# Patient Record
Sex: FEMALE | Race: WHITE | Employment: OTHER | URBAN - METROPOLITAN AREA
[De-identification: names, ages, dates, MRNs, and addresses within clinical notes are randomized per-mention and may not be internally consistent; named-entity substitution may affect disease eponyms.]

---

## 2017-01-20 RX ORDER — PRAVASTATIN SODIUM 40 MG
40 TABLET ORAL EVERY EVENING
Qty: 90 TABLET | Refills: 1 | Status: SHIPPED | OUTPATIENT
Start: 2017-01-20 | End: 2017-08-07 | Stop reason: SINTOL

## 2017-07-11 ENCOUNTER — TELEPHONE (OUTPATIENT)
Dept: FAMILY MEDICINE CLINIC | Age: 72
End: 2017-07-11

## 2017-08-07 ENCOUNTER — TELEPHONE (OUTPATIENT)
Dept: FAMILY MEDICINE CLINIC | Age: 72
End: 2017-08-07

## 2017-08-15 LAB
ALBUMIN SERPL-MCNC: 3.9 G/DL (ref 3.2–5.3)
ALK PHOSPHATASE: 59 IU/L (ref 35–121)
ALT SERPL-CCNC: 27 IU/L (ref 5–59)
ANION GAP SERPL CALCULATED.3IONS-SCNC: 11 MMOL/L
AST SERPL-CCNC: 17 IU/L (ref 10–42)
BILIRUB SERPL-MCNC: 0.5 MG/DL (ref 0.2–1.3)
BUN BLDV-MCNC: 18 MG/DL (ref 9–24)
CALCIUM SERPL-MCNC: 9.4 MG/DL (ref 8.7–10.8)
CHLORIDE BLD-SCNC: 106 MMOL/L (ref 95–111)
CHOLESTEROL, TOTAL: NORMAL MG/DL
CHOLESTEROL/HDL RATIO: 3.2
CHOLESTEROL/HDL RATIO: NORMAL
CHOLESTEROL: 225 MG/DL
CO2: 30 MMOL/L (ref 21–32)
CREAT SERPL-MCNC: 0.7 MG/DL (ref 0.5–1.3)
EGFR AFRICAN AMERICAN: 99
EGFR IF NONAFRICAN AMERICAN: 82
GLUCOSE: 88 MG/DL (ref 70–100)
HDLC SERPL-MCNC: 71 MG/DL (ref 40–60)
HDLC SERPL-MCNC: NORMAL MG/DL (ref 35–70)
LDL CHOLESTEROL CALCULATED: 1.9 MG/DL (ref 0–160)
LDL CHOLESTEROL CALCULATED: 136 MG/DL
LDL/HDL RATIO: 1.9
POTASSIUM SERPL-SCNC: 4.5 MMOL/L (ref 3.5–5.4)
SODIUM BLD-SCNC: 142 MMOL/L (ref 134–147)
TOTAL PROTEIN: 6.2 G/DL (ref 5.8–8)
TRIGL SERPL-MCNC: 88 MG/DL
TRIGL SERPL-MCNC: NORMAL MG/DL
VLDLC SERPL CALC-MCNC: 18 MG/DL
VLDLC SERPL CALC-MCNC: NORMAL MG/DL

## 2017-08-16 LAB — VITAMIN D 25-HYDROXY: 30 NG/ML

## 2017-08-17 ENCOUNTER — TELEPHONE (OUTPATIENT)
Dept: FAMILY MEDICINE CLINIC | Age: 72
End: 2017-08-17

## 2017-08-17 DIAGNOSIS — E55.9 VITAMIN D DEFICIENCY: Primary | ICD-10-CM

## 2017-08-17 DIAGNOSIS — E55.9 VITAMIN D DEFICIENCY: ICD-10-CM

## 2017-08-17 DIAGNOSIS — E78.00 PURE HYPERCHOLESTEROLEMIA: ICD-10-CM

## 2017-10-02 ENCOUNTER — OFFICE VISIT (OUTPATIENT)
Dept: FAMILY MEDICINE CLINIC | Age: 72
End: 2017-10-02

## 2017-10-02 VITALS
BODY MASS INDEX: 22.68 KG/M2 | HEIGHT: 63 IN | RESPIRATION RATE: 16 BRPM | DIASTOLIC BLOOD PRESSURE: 58 MMHG | SYSTOLIC BLOOD PRESSURE: 106 MMHG | WEIGHT: 128 LBS | HEART RATE: 68 BPM

## 2017-10-02 DIAGNOSIS — M54.50 CHRONIC BILATERAL LOW BACK PAIN WITHOUT SCIATICA: ICD-10-CM

## 2017-10-02 DIAGNOSIS — E78.00 PURE HYPERCHOLESTEROLEMIA: Primary | ICD-10-CM

## 2017-10-02 DIAGNOSIS — Z23 NEED FOR INFLUENZA VACCINATION: ICD-10-CM

## 2017-10-02 DIAGNOSIS — G89.29 CHRONIC BILATERAL LOW BACK PAIN WITHOUT SCIATICA: ICD-10-CM

## 2017-10-02 PROCEDURE — G0008 ADMIN INFLUENZA VIRUS VAC: HCPCS | Performed by: FAMILY MEDICINE

## 2017-10-02 PROCEDURE — 99214 OFFICE O/P EST MOD 30 MIN: CPT | Performed by: FAMILY MEDICINE

## 2017-10-02 RX ORDER — PRAVASTATIN SODIUM 40 MG
40 TABLET ORAL DAILY
COMMUNITY
End: 2017-11-09 | Stop reason: SDUPTHER

## 2017-10-02 ASSESSMENT — PATIENT HEALTH QUESTIONNAIRE - PHQ9
1. LITTLE INTEREST OR PLEASURE IN DOING THINGS: 0
SUM OF ALL RESPONSES TO PHQ QUESTIONS 1-9: 0
SUM OF ALL RESPONSES TO PHQ9 QUESTIONS 1 & 2: 0
2. FEELING DOWN, DEPRESSED OR HOPELESS: 0

## 2017-10-02 ASSESSMENT — ENCOUNTER SYMPTOMS
CONSTIPATION: 0
BACK PAIN: 1
SHORTNESS OF BREATH: 0
SINUS PRESSURE: 0

## 2017-10-02 NOTE — PATIENT INSTRUCTIONS
Take some CoQ10 for a week then resume the pravastatin.   If you are able to stay on the pravastatin then check the fasting LDL in early April  Check the vit D in early April too  Consider taking 2 Aleve with food before leaving the house for work  Be sure to contact Dr Beverly Smith office for colonoscopy  Get the mammogram arranged

## 2017-10-02 NOTE — PROGRESS NOTES
Subjective:      Patient ID: Hazel Pompa is a 74 y.o. female. HPI  1. She had noticed some muscle ache when the pharmacy changed manufactures of the pravastatin. 2. She stopped it and felt better having just some occasional cramps but is working on her feet now too. 3. She wears a patch on the lower back at times the past 6 years. It doesn't radiate or worsen. Review of Systems   Constitutional: Negative for fatigue. HENT: Negative for sinus pressure. Eyes: Negative for visual disturbance. Respiratory: Negative for shortness of breath. Cardiovascular: Negative for chest pain. Gastrointestinal: Negative for constipation. Genitourinary: Negative. Musculoskeletal: Positive for back pain. Negative for arthralgias. Skin: Negative for rash. Neurological: Negative for headaches. The patient's medications, allergies, past medical problems, surgical, social, and family histories were reviewed and updated as needed. Objective:   Physical Exam   Constitutional: She is oriented to person, place, and time. She appears well-developed and well-nourished. No distress. HENT:   Head: Normocephalic and atraumatic. Right Ear: External ear normal.   Left Ear: External ear normal.   Nose: Nose normal.   Mouth/Throat: Oropharynx is clear and moist. No oropharyngeal exudate. Eyes: Conjunctivae are normal. No scleral icterus. Neck: Neck supple. Carotid bruit is not present. No tracheal deviation present. No thyromegaly present. Cardiovascular: Normal rate, regular rhythm, normal heart sounds and intact distal pulses. Pulmonary/Chest: Effort normal and breath sounds normal.   Abdominal: Soft. Bowel sounds are normal. She exhibits no mass. Musculoskeletal: She exhibits no edema. Legs:  Lymphadenopathy:     She has no cervical adenopathy. Neurological: She is alert and oriented to person, place, and time. Skin: Skin is warm and dry. Psychiatric: She has a normal mood and affect. Her behavior is normal.   Blood pressure (!) 106/58, pulse 68, resp. rate 16, height 5' 3\" (1.6 m), weight 128 lb (58.1 kg), not currently breastfeeding. Assessment:      1. Pure hypercholesterolemia  LDL Cholesterol, Direct   2. Chronic bilateral low back pain without sciatica     3. Need for influenza vaccination  INFLUENZA, QUADV, 18 YRS AND OLDER, IM, MDV, 0.5ML (AFLURIA QUADV)           Plan:      Take some CoQ10 for a week then resume the pravastatin.   If you are able to stay on the pravastatin then check the fasting LDL in early April  Check the vit D in early April too  Consider taking 2 Aleve with food before leaving the house for work  Be sure to contact Dr Beverly Smith office for colonoscopy  Get the mammogram arranged

## 2017-11-10 RX ORDER — PRAVASTATIN SODIUM 40 MG
40 TABLET ORAL DAILY
Qty: 90 TABLET | Refills: 3 | Status: SHIPPED | OUTPATIENT
Start: 2017-11-10 | End: 2019-01-18 | Stop reason: SDUPTHER

## 2018-09-25 ENCOUNTER — TELEPHONE (OUTPATIENT)
Dept: FAMILY MEDICINE CLINIC | Age: 73
End: 2018-09-25

## 2018-09-25 DIAGNOSIS — E78.00 PURE HYPERCHOLESTEROLEMIA: Primary | ICD-10-CM

## 2018-09-25 DIAGNOSIS — E55.9 VITAMIN D DEFICIENCY: ICD-10-CM

## 2019-01-08 LAB
ALBUMIN SERPL-MCNC: 4.3 G/DL (ref 3.5–5.2)
ALK PHOSPHATASE: 71 U/L (ref 30–146)
ALT SERPL-CCNC: 35 U/L (ref 5–40)
ANION GAP SERPL CALCULATED.3IONS-SCNC: 17 MEQ/L (ref 10–19)
AST SERPL-CCNC: 26 U/L (ref 9–40)
BILIRUB SERPL-MCNC: 0.3 MG/DL
BUN BLDV-MCNC: 26 MG/DL (ref 8–23)
CALCIUM SERPL-MCNC: 9.7 MG/DL (ref 8.5–10.5)
CHLORIDE BLD-SCNC: 97 MEQ/L (ref 95–107)
CHOLESTEROL/HDL RATIO: 2.9
CHOLESTEROL: 254 MG/DL
CO2: 27 MEQ/L (ref 19–31)
CREAT SERPL-MCNC: 0.8 MG/DL (ref 0.6–1.3)
EGFR AFRICAN AMERICAN: 83.6 ML/MIN/1.73 M2
EGFR IF NONAFRICAN AMERICAN: 72.1 ML/MIN/1.73 M2
GLUCOSE: 103 MG/DL (ref 70–99)
HDLC SERPL-MCNC: 88.5 MG/DL
LDL CHOLESTEROL CALCULATED: 150 MG/DL
LDL/HDL RATIO: 1.7
POTASSIUM SERPL-SCNC: 4.8 MEQ/L (ref 3.5–5.4)
SODIUM BLD-SCNC: 141 MEQ/L (ref 135–146)
TOTAL PROTEIN: 7.1 G/DL (ref 6.1–8.3)
TRIGL SERPL-MCNC: 76 MG/DL
VLDLC SERPL CALC-MCNC: 15 MG/DL

## 2019-01-10 LAB — VITAMIN D 25-HYDROXY: 27 NG/ML

## 2019-01-11 DIAGNOSIS — E55.9 VITAMIN D DEFICIENCY: ICD-10-CM

## 2019-01-11 DIAGNOSIS — E78.00 PURE HYPERCHOLESTEROLEMIA: Primary | ICD-10-CM

## 2019-01-14 ENCOUNTER — TELEPHONE (OUTPATIENT)
Dept: FAMILY MEDICINE CLINIC | Age: 74
End: 2019-01-14

## 2019-01-18 RX ORDER — PRAVASTATIN SODIUM 40 MG
40 TABLET ORAL DAILY
Qty: 90 TABLET | Refills: 3 | Status: SHIPPED | OUTPATIENT
Start: 2019-01-18 | End: 2020-07-21 | Stop reason: SDUPTHER

## 2019-03-26 LAB
ALBUMIN SERPL-MCNC: 4.3 G/DL (ref 3.5–5.2)
ALK PHOSPHATASE: 64 U/L (ref 30–146)
ALT SERPL-CCNC: 25 U/L (ref 5–40)
AST SERPL-CCNC: 22 U/L (ref 9–40)
BILIRUB SERPL-MCNC: 0.3 MG/DL
BILIRUBIN DIRECT: <0.2 MG/DL
GLUCOSE: 89 MG/DL (ref 70–99)
LDL CHOLESTEROL DIRECT: 122 MG/DL
TOTAL PROTEIN: 6.7 G/DL (ref 6.1–8.3)

## 2019-03-27 LAB — VITAMIN D 25-HYDROXY: 35 NG/ML

## 2019-04-05 DIAGNOSIS — E55.9 VITAMIN D DEFICIENCY: ICD-10-CM

## 2019-04-05 DIAGNOSIS — E78.00 PURE HYPERCHOLESTEROLEMIA: Primary | ICD-10-CM

## 2019-04-10 ENCOUNTER — TELEPHONE (OUTPATIENT)
Dept: FAMILY MEDICINE CLINIC | Age: 74
End: 2019-04-10

## 2019-04-10 NOTE — TELEPHONE ENCOUNTER
----- Message from Masood Chandler MD sent at 4/5/2019  2:35 PM EDT -----  Let her know the labs showed the need to continue with over the counter vit D3 at 5,000 units once daily with a meal for better absorption. Check the fasting lab in early October. The LDL is better but could still be better still. Continue the diet and daily walking for 20 minutes. Check it fasting in early October also.

## 2019-06-10 ENCOUNTER — OFFICE VISIT (OUTPATIENT)
Dept: FAMILY MEDICINE CLINIC | Age: 74
End: 2019-06-10

## 2019-06-10 VITALS
SYSTOLIC BLOOD PRESSURE: 120 MMHG | RESPIRATION RATE: 12 BRPM | HEART RATE: 64 BPM | DIASTOLIC BLOOD PRESSURE: 70 MMHG | HEIGHT: 63 IN | WEIGHT: 125 LBS | BODY MASS INDEX: 22.15 KG/M2

## 2019-06-10 DIAGNOSIS — M54.50 LUMBAR BACK PAIN: ICD-10-CM

## 2019-06-10 DIAGNOSIS — M53.3 SACROILIAC JOINT DYSFUNCTION OF LEFT SIDE: Primary | ICD-10-CM

## 2019-06-10 PROCEDURE — 4040F PNEUMOC VAC/ADMIN/RCVD: CPT | Performed by: FAMILY MEDICINE

## 2019-06-10 PROCEDURE — 3017F COLORECTAL CA SCREEN DOC REV: CPT | Performed by: FAMILY MEDICINE

## 2019-06-10 PROCEDURE — 1123F ACP DISCUSS/DSCN MKR DOCD: CPT | Performed by: FAMILY MEDICINE

## 2019-06-10 PROCEDURE — G8400 PT W/DXA NO RESULTS DOC: HCPCS | Performed by: FAMILY MEDICINE

## 2019-06-10 PROCEDURE — G8420 CALC BMI NORM PARAMETERS: HCPCS | Performed by: FAMILY MEDICINE

## 2019-06-10 PROCEDURE — 1090F PRES/ABSN URINE INCON ASSESS: CPT | Performed by: FAMILY MEDICINE

## 2019-06-10 PROCEDURE — 99213 OFFICE O/P EST LOW 20 MIN: CPT | Performed by: FAMILY MEDICINE

## 2019-06-10 PROCEDURE — G8427 DOCREV CUR MEDS BY ELIG CLIN: HCPCS | Performed by: FAMILY MEDICINE

## 2019-06-10 RX ORDER — PREDNISONE 20 MG/1
TABLET ORAL
Qty: 21 TABLET | Refills: 0 | Status: SHIPPED | OUTPATIENT
Start: 2019-06-10 | End: 2019-06-24 | Stop reason: ALTCHOICE

## 2019-06-10 RX ORDER — TIZANIDINE 4 MG/1
4 TABLET ORAL EVERY 8 HOURS PRN
Qty: 30 TABLET | Refills: 1 | Status: SHIPPED | OUTPATIENT
Start: 2019-06-10 | End: 2019-06-24 | Stop reason: ALTCHOICE

## 2019-06-10 RX ORDER — TRAMADOL HYDROCHLORIDE 50 MG/1
50 TABLET ORAL EVERY 6 HOURS PRN
Qty: 28 TABLET | Refills: 0 | Status: SHIPPED | OUTPATIENT
Start: 2019-06-10 | End: 2019-06-17

## 2019-06-10 ASSESSMENT — ENCOUNTER SYMPTOMS
SORE THROAT: 0
ABDOMINAL PAIN: 0
BACK PAIN: 1
CONSTIPATION: 0
NAUSEA: 0
EYE PAIN: 0
CHEST TIGHTNESS: 0
SHORTNESS OF BREATH: 0
COUGH: 0
WHEEZING: 0

## 2019-06-10 NOTE — PROGRESS NOTES
Subjective:      Patient ID: Joseph Laguerre is a 74 y.o. female. Hip Pain    The incident occurred 5 to 7 days ago. The incident occurred at home. There was no injury mechanism. The pain is present in the left hip and left knee (  low  back area ). The quality of the pain is described as aching. The pain has been fluctuating since onset. Pertinent negatives include no numbness. The symptoms are aggravated by movement. She has tried ice and NSAIDs for the symptoms. The treatment provided mild relief. Past Medical History:   Diagnosis Date    Hyperlipidemia     Low back pain 6/26/2015    Vitamin D deficiency      Review of Systems   Constitutional: Negative for appetite change, fatigue and fever. HENT: Negative for congestion, ear pain, postnasal drip and sore throat. Eyes: Negative for pain and visual disturbance. Respiratory: Negative for cough, chest tightness, shortness of breath and wheezing. Cardiovascular: Negative for chest pain, palpitations and leg swelling. Gastrointestinal: Negative for abdominal pain, constipation and nausea. Genitourinary: Negative for dysuria and frequency. Musculoskeletal: Positive for back pain. Negative for arthralgias, joint swelling, neck pain and neck stiffness. Skin: Negative for rash. Neurological: Negative for dizziness, weakness, numbness and headaches. Hematological: Negative for adenopathy. Does not bruise/bleed easily. Psychiatric/Behavioral: Negative for behavioral problems and sleep disturbance. The patient is not nervous/anxious. /70 (Site: Right Upper Arm, Position: Sitting, Cuff Size: Medium Adult)   Pulse 64   Resp 12   Ht 5' 3\" (1.6 m)   Wt 125 lb (56.7 kg)   BMI 22.14 kg/m²   Objective:   Physical Exam   Constitutional: She is oriented to person, place, and time. She appears well-developed and well-nourished. HENT:   Head: Normocephalic and atraumatic.    Right Ear: External ear normal.   Left Ear: External ear normal.   Nose: Nose normal.   Mouth/Throat: Oropharynx is clear and moist.   Eyes: Pupils are equal, round, and reactive to light. Conjunctivae and EOM are normal. No scleral icterus. Neck: Normal range of motion. Neck supple. No JVD present. No thyromegaly present. Cardiovascular: Normal rate, regular rhythm, normal heart sounds and intact distal pulses. Pulmonary/Chest: Effort normal and breath sounds normal. She has no wheezes. She has no rales. Abdominal: Soft. Bowel sounds are normal. She exhibits no distension and no mass. There is no tenderness. Musculoskeletal:        Lumbar back: She exhibits decreased range of motion, tenderness, pain and spasm. Back:    Lymphadenopathy:     She has no cervical adenopathy. Neurological: She is alert and oriented to person, place, and time. She has normal reflexes. No cranial nerve deficit. Skin: Skin is warm and dry. No rash noted. Psychiatric: She has a normal mood and affect. Nursing note and vitals reviewed. Assessment:       Diagnosis Orders   1. Sacroiliac joint dysfunction of left side     2. Lumbar back pain           Plan:      Current Outpatient Medications   Medication Sig Dispense Refill    predniSONE (DELTASONE) 20 MG tablet One   Tab po  Tid  For 3 days and  Them one  Bid  For  5  Days and then one a  Day  For  5  Days 21 tablet 0    tiZANidine (ZANAFLEX) 4 MG tablet Take 1 tablet by mouth every 8 hours as needed (back spasm) 30 tablet 1    traMADol (ULTRAM) 50 MG tablet Take 1 tablet by mouth every 6 hours as needed for Pain for up to 7 days. Intended supply: 7 days. Take lowest dose possible to manage pain 28 tablet 0    pravastatin (PRAVACHOL) 40 MG tablet Take 1 tablet by mouth daily 90 tablet 3    B Complex Vitamins (B-COMPLEX/B-12 PO) Take by mouth      Cholecalciferol (VITAMIN D3) 5000 UNITS CAPS Take 1 capsule by mouth daily.  Flaxseed, Linseed, (FLAX SEED OIL PO) Take  by mouth.       Omega-3 Fatty Acids (FISH OIL PO) Take  by mouth.  Coenzyme Q10 (CO Q 10 PO) Take  by mouth. No current facility-administered medications for this visit. No results found for this visit on 06/10/19. No orders of the defined types were placed in this encounter.      see rikki Hernandez MD

## 2019-06-17 ENCOUNTER — TELEPHONE (OUTPATIENT)
Dept: FAMILY MEDICINE CLINIC | Age: 74
End: 2019-06-17

## 2019-06-17 DIAGNOSIS — M53.3 SACROILIAC JOINT DYSFUNCTION OF LEFT SIDE: Primary | ICD-10-CM

## 2019-06-17 DIAGNOSIS — M54.50 LUMBAR BACK PAIN: ICD-10-CM

## 2019-06-17 NOTE — TELEPHONE ENCOUNTER
Pt called still having left side hip pain. Pt had a massage since appt here but no better. Pt ask if you feel an xray may be needed or other testing?     [address]    Please call pt to inform

## 2019-06-18 ENCOUNTER — TELEPHONE (OUTPATIENT)
Dept: FAMILY MEDICINE CLINIC | Age: 74
End: 2019-06-18

## 2019-06-18 NOTE — TELEPHONE ENCOUNTER
The x rays showed an increase in the arthritis from 2015 in the hips and lower back. It would be good if she could see me to evaluate her symptoms myself.

## 2019-06-20 ENCOUNTER — OFFICE VISIT (OUTPATIENT)
Dept: FAMILY MEDICINE CLINIC | Age: 74
End: 2019-06-20

## 2019-06-20 VITALS
WEIGHT: 127.38 LBS | RESPIRATION RATE: 13 BRPM | SYSTOLIC BLOOD PRESSURE: 138 MMHG | HEART RATE: 74 BPM | DIASTOLIC BLOOD PRESSURE: 84 MMHG | HEIGHT: 63 IN | BODY MASS INDEX: 22.57 KG/M2

## 2019-06-20 DIAGNOSIS — I73.00 RAYNAUD'S PHENOMENON WITHOUT GANGRENE: Primary | ICD-10-CM

## 2019-06-20 PROCEDURE — 4040F PNEUMOC VAC/ADMIN/RCVD: CPT | Performed by: FAMILY MEDICINE

## 2019-06-20 PROCEDURE — 99213 OFFICE O/P EST LOW 20 MIN: CPT | Performed by: FAMILY MEDICINE

## 2019-06-20 PROCEDURE — G8427 DOCREV CUR MEDS BY ELIG CLIN: HCPCS | Performed by: FAMILY MEDICINE

## 2019-06-20 PROCEDURE — 3017F COLORECTAL CA SCREEN DOC REV: CPT | Performed by: FAMILY MEDICINE

## 2019-06-20 PROCEDURE — G8400 PT W/DXA NO RESULTS DOC: HCPCS | Performed by: FAMILY MEDICINE

## 2019-06-20 PROCEDURE — 1123F ACP DISCUSS/DSCN MKR DOCD: CPT | Performed by: FAMILY MEDICINE

## 2019-06-20 PROCEDURE — 1090F PRES/ABSN URINE INCON ASSESS: CPT | Performed by: FAMILY MEDICINE

## 2019-06-20 PROCEDURE — G8420 CALC BMI NORM PARAMETERS: HCPCS | Performed by: FAMILY MEDICINE

## 2019-06-20 ASSESSMENT — ENCOUNTER SYMPTOMS
SHORTNESS OF BREATH: 0
SORE THROAT: 0
CHEST TIGHTNESS: 0
NAUSEA: 0
COUGH: 0
WHEEZING: 0
ABDOMINAL PAIN: 0
CONSTIPATION: 0
EYE PAIN: 0

## 2019-06-20 ASSESSMENT — PATIENT HEALTH QUESTIONNAIRE - PHQ9
SUM OF ALL RESPONSES TO PHQ QUESTIONS 1-9: 0
2. FEELING DOWN, DEPRESSED OR HOPELESS: 0
SUM OF ALL RESPONSES TO PHQ QUESTIONS 1-9: 0
SUM OF ALL RESPONSES TO PHQ9 QUESTIONS 1 & 2: 0
1. LITTLE INTEREST OR PLEASURE IN DOING THINGS: 0

## 2019-06-20 NOTE — PROGRESS NOTES
Subjective:      Patient ID: Alfonso Alvarez is a 74 y.o. female. Back area    With  Pain  And  With  Arthritis  And  Scoliosis      Toes    Red and  noted     Other   This is a new problem. The current episode started in the past 7 days. Associated symptoms include myalgias. Pertinent negatives include no abdominal pain, arthralgias, chest pain, congestion, coughing, fatigue, fever, headaches, joint swelling, nausea, neck pain, numbness, rash, sore throat or weakness. Associated symptoms comments: Toes  Darker   Color      Some    coldness. Nothing aggravates the symptoms. Past Medical History:   Diagnosis Date    Hyperlipidemia     Low back pain 6/26/2015    Vitamin D deficiency      Review of Systems   Constitutional: Negative for appetite change, fatigue and fever. HENT: Negative for congestion, ear pain, postnasal drip and sore throat. Eyes: Negative for pain and visual disturbance. Respiratory: Negative for cough, chest tightness, shortness of breath and wheezing. Cardiovascular: Negative for chest pain, palpitations and leg swelling. Gastrointestinal: Negative for abdominal pain, constipation and nausea. Genitourinary: Negative for dysuria and frequency. Musculoskeletal: Positive for myalgias. Negative for arthralgias, joint swelling, neck pain and neck stiffness. Toes  blueness  All  Noted and   Blue  To  Th mid foot     Skin: Negative for rash. Neurological: Negative for dizziness, weakness, numbness and headaches. Hematological: Negative for adenopathy. Does not bruise/bleed easily. Psychiatric/Behavioral: Negative for behavioral problems and sleep disturbance. The patient is not nervous/anxious. /84 (Site: Right Upper Arm, Position: Sitting, Cuff Size: Medium Adult)   Pulse 74   Resp 13   Ht 5' 3\" (1.6 m)   Wt 127 lb 6 oz (57.8 kg)   BMI 22.56 kg/m²   Objective:   Physical Exam   Constitutional: She is oriented to person, place, and time.  She appears well-developed and well-nourished. HENT:   Head: Normocephalic and atraumatic. Right Ear: External ear normal.   Left Ear: External ear normal.   Nose: Nose normal.   Mouth/Throat: Oropharynx is clear and moist.   Eyes: Pupils are equal, round, and reactive to light. Conjunctivae and EOM are normal. No scleral icterus. Neck: Normal range of motion. Neck supple. No JVD present. No thyromegaly present. Cardiovascular: Normal rate, regular rhythm, normal heart sounds and intact distal pulses. Pulmonary/Chest: Effort normal and breath sounds normal. She has no wheezes. She has no rales. Abdominal: Soft. Bowel sounds are normal. She exhibits no distension and no mass. There is no tenderness. Musculoskeletal: Normal range of motion. She exhibits no tenderness. Left  Low  Back pain   Lymphadenopathy:     She has no cervical adenopathy. Neurological: She is alert and oriented to person, place, and time. She has normal reflexes. No cranial nerve deficit. Skin: Skin is warm and dry. No rash noted. Toes   Blue and  With elevation of  Legs  Clears      pulse  2 +   Psychiatric: She has a normal mood and affect. Nursing note and vitals reviewed. Assessment:       Diagnosis Orders   1. Raynaud's phenomenon without gangrene           Plan:        Current Outpatient Medications   Medication Sig Dispense Refill    predniSONE (DELTASONE) 20 MG tablet One   Tab po  Tid  For 3 days and  Them one  Bid  For  5  Days and then one a  Day  For  5  Days 21 tablet 0    tiZANidine (ZANAFLEX) 4 MG tablet Take 1 tablet by mouth every 8 hours as needed (back spasm) 30 tablet 1    pravastatin (PRAVACHOL) 40 MG tablet Take 1 tablet by mouth daily 90 tablet 3    B Complex Vitamins (B-COMPLEX/B-12 PO) Take by mouth      Cholecalciferol (VITAMIN D3) 5000 UNITS CAPS Take 1 capsule by mouth daily.  Flaxseed, Linseed, (FLAX SEED OIL PO) Take  by mouth.       Omega-3 Fatty Acids (FISH OIL PO) Take  by mouth.      Coenzyme Q10 (CO Q 10 PO) Take  by mouth. No current facility-administered medications for this visit. No orders of the defined types were placed in this encounter. Toes improved with having them raised upward pulses are good and pulser  3+.  If persistent problems we'll look at Norvasc 2.5 mg    Maggie Joy MD

## 2019-06-24 ENCOUNTER — TELEPHONE (OUTPATIENT)
Dept: FAMILY MEDICINE CLINIC | Age: 74
End: 2019-06-24

## 2019-06-24 ENCOUNTER — OFFICE VISIT (OUTPATIENT)
Dept: FAMILY MEDICINE CLINIC | Age: 74
End: 2019-06-24

## 2019-06-24 VITALS
DIASTOLIC BLOOD PRESSURE: 64 MMHG | SYSTOLIC BLOOD PRESSURE: 134 MMHG | RESPIRATION RATE: 16 BRPM | HEIGHT: 60 IN | BODY MASS INDEX: 24.37 KG/M2 | HEART RATE: 78 BPM | WEIGHT: 124.13 LBS

## 2019-06-24 DIAGNOSIS — R10.9 LEFT FLANK PAIN: ICD-10-CM

## 2019-06-24 DIAGNOSIS — R09.89 OTHER SPECIFIED SYMPTOMS AND SIGNS INVOLVING THE CIRCULATORY AND RESPIRATORY SYSTEMS: ICD-10-CM

## 2019-06-24 DIAGNOSIS — R31.9 HEMATURIA, UNSPECIFIED TYPE: ICD-10-CM

## 2019-06-24 DIAGNOSIS — L81.9 DISCOLORATION OF SKIN OF TOE: ICD-10-CM

## 2019-06-24 DIAGNOSIS — Z12.11 SCREEN FOR COLON CANCER: ICD-10-CM

## 2019-06-24 DIAGNOSIS — Z12.39 BREAST CANCER SCREENING: Primary | ICD-10-CM

## 2019-06-24 DIAGNOSIS — Z78.0 POST-MENOPAUSAL: ICD-10-CM

## 2019-06-24 LAB
BACTERIA URINE, POC: ABNORMAL
BILIRUBIN URINE: 0 MG/DL
BLOOD, URINE: POSITIVE
CASTS URINE, POC: ABNORMAL
CLARITY: CLEAR
COLOR: YELLOW
CRYSTALS URINE, POC: ABNORMAL
EPI CELLS URINE, POC: ABNORMAL
GLUCOSE URINE: NEGATIVE
KETONES, URINE: NEGATIVE
LEUKOCYTE EST, POC: NEGATIVE
NITRITE, URINE: NEGATIVE
PH UA: 5 (ref 4.5–8)
PROTEIN UA: NEGATIVE
RBC URINE, POC: ABNORMAL
SPECIFIC GRAVITY UA: 1.01 (ref 1–1.03)
UROBILINOGEN, URINE: NORMAL
WBC URINE, POC: ABNORMAL
YEAST URINE, POC: ABNORMAL

## 2019-06-24 PROCEDURE — G8427 DOCREV CUR MEDS BY ELIG CLIN: HCPCS | Performed by: FAMILY MEDICINE

## 2019-06-24 PROCEDURE — 3017F COLORECTAL CA SCREEN DOC REV: CPT | Performed by: FAMILY MEDICINE

## 2019-06-24 PROCEDURE — 81000 URINALYSIS NONAUTO W/SCOPE: CPT | Performed by: FAMILY MEDICINE

## 2019-06-24 PROCEDURE — 4040F PNEUMOC VAC/ADMIN/RCVD: CPT | Performed by: FAMILY MEDICINE

## 2019-06-24 PROCEDURE — 99213 OFFICE O/P EST LOW 20 MIN: CPT | Performed by: FAMILY MEDICINE

## 2019-06-24 PROCEDURE — G8400 PT W/DXA NO RESULTS DOC: HCPCS | Performed by: FAMILY MEDICINE

## 2019-06-24 PROCEDURE — G8420 CALC BMI NORM PARAMETERS: HCPCS | Performed by: FAMILY MEDICINE

## 2019-06-24 PROCEDURE — 1123F ACP DISCUSS/DSCN MKR DOCD: CPT | Performed by: FAMILY MEDICINE

## 2019-06-24 PROCEDURE — 1090F PRES/ABSN URINE INCON ASSESS: CPT | Performed by: FAMILY MEDICINE

## 2019-06-24 ASSESSMENT — PATIENT HEALTH QUESTIONNAIRE - PHQ9
SUM OF ALL RESPONSES TO PHQ9 QUESTIONS 1 & 2: 0
SUM OF ALL RESPONSES TO PHQ QUESTIONS 1-9: 0
SUM OF ALL RESPONSES TO PHQ QUESTIONS 1-9: 0
2. FEELING DOWN, DEPRESSED OR HOPELESS: 0
1. LITTLE INTEREST OR PLEASURE IN DOING THINGS: 0

## 2019-06-24 ASSESSMENT — ENCOUNTER SYMPTOMS
SINUS PRESSURE: 0
CONSTIPATION: 0
SHORTNESS OF BREATH: 0

## 2019-06-24 NOTE — PROGRESS NOTES
Pt scheduled for LUPE Digital Screening and Dexa Bone Density at [address] [place] [address] on Wed July 3rd at 1pm. Both test are done on that day following one another. Pt was informed of appt and prep. Sent information over to [facility] and they will call the pt for an appt.

## 2019-06-24 NOTE — TELEPHONE ENCOUNTER
CT urogram and VL meg bilateral. Scheduled with [facility] on July 8th at 630 am. Pt to arrive at 630 to outpt express for the CT. NPO for 4 hrs. The VL is immediately after. Pt will return call to the office when she is home to get this information.

## 2019-06-25 LAB
ABSOLUTE BASO #: 0.1 K/UL (ref 0–0.1)
ABSOLUTE EOS #: 0.1 K/UL (ref 0.1–0.4)
ABSOLUTE LYMPH #: 2.3 K/UL (ref 0.8–5.2)
ABSOLUTE MONO #: 0.9 K/UL (ref 0.1–0.9)
ABSOLUTE NEUT #: 7.8 K/UL (ref 1.3–9.1)
ALBUMIN SERPL-MCNC: 4.1 G/DL (ref 3.2–5.3)
ALK PHOSPHATASE: 65 U/L (ref 39–130)
ALT SERPL-CCNC: 36 U/L (ref 0–31)
ANION GAP SERPL CALCULATED.3IONS-SCNC: 11 MMOL/L (ref 4–12)
AST SERPL-CCNC: 16 U/L (ref 0–41)
BASOPHILS RELATIVE PERCENT: 0.6 %
BILIRUB SERPL-MCNC: 0.4 MG/DL (ref 0.3–1.2)
BUN BLDV-MCNC: 22 MG/DL (ref 5–27)
CALCIUM SERPL-MCNC: 9.7 MG/DL (ref 8.5–10.5)
CHLORIDE BLD-SCNC: 103 MMOL/L (ref 98–109)
CO2: 27 MMOL/L (ref 22–32)
CREAT SERPL-MCNC: 0.72 MG/DL (ref 0.4–1)
EGFR AFRICAN AMERICAN: >60 ML/MIN/1.73SQ.M
EGFR IF NONAFRICAN AMERICAN: >60 ML/MIN/1.73SQ.M
EOSINOPHILS RELATIVE PERCENT: 1.2 %
GLUCOSE: 105 MG/DL (ref 65–99)
HCT VFR BLD CALC: 46 % (ref 36–48)
HEMOGLOBIN: 16.1 G/DL (ref 12–16)
LYMPHOCYTE %: 20.2 %
MCH RBC QN AUTO: 34 PG (ref 27–34)
MCHC RBC AUTO-ENTMCNC: 35 G/DL (ref 31–36)
MCV RBC AUTO: 97.3 FL (ref 80–100)
MONOCYTES # BLD: 8.2 %
NEUTROPHILS RELATIVE PERCENT: 69.3 %
PDW BLD-RTO: 11.7 % (ref 10.8–14.8)
PLATELETS: 359 K/UL (ref 150–450)
POTASSIUM SERPL-SCNC: 5 MMOL/L (ref 3.5–5)
RBC: 4.73 M/UL (ref 4–5.5)
REPORT STATUS: NORMAL
SEDIMENTATION RATE, ERYTHROCYTE: 29 MM/HR (ref 0–20)
SITE/TYPE: NORMAL
SODIUM BLD-SCNC: 141 MMOL/L (ref 134–146)
TOTAL PROTEIN: 6.9 G/DL (ref 6–8)
URINE CULTURE, ROUTINE: NORMAL
WBC: 11.3 K/UL (ref 3.7–10.8)

## 2019-06-27 ENCOUNTER — TELEPHONE (OUTPATIENT)
Dept: FAMILY MEDICINE CLINIC | Age: 74
End: 2019-06-27

## 2019-06-27 NOTE — TELEPHONE ENCOUNTER
----- Message from Mei Leon MD sent at 6/26/2019 10:23 PM EDT -----  Let her know that the labs were basically fine and we can discuss them further when she comes in for the post testing appointment.

## 2019-07-02 ENCOUNTER — TELEPHONE (OUTPATIENT)
Dept: FAMILY MEDICINE CLINIC | Age: 74
End: 2019-07-02

## 2019-07-02 NOTE — TELEPHONE ENCOUNTER
Could I see Kasia He at ([phone]) tomorrow for an appointment and we could discuss it further then since it's too late in the day now for arranging a future segmental pressures test

## 2019-07-02 NOTE — TELEPHONE ENCOUNTER
Herminia called back stating she spoke with Dr Lorraine Mishra office and they suggested Dr Baldo Antunez order Segmental Pressure testing.

## 2019-07-05 ENCOUNTER — OFFICE VISIT (OUTPATIENT)
Dept: FAMILY MEDICINE CLINIC | Age: 74
End: 2019-07-05

## 2019-07-05 VITALS
HEIGHT: 60 IN | SYSTOLIC BLOOD PRESSURE: 128 MMHG | HEART RATE: 72 BPM | RESPIRATION RATE: 16 BRPM | WEIGHT: 125.5 LBS | BODY MASS INDEX: 24.64 KG/M2 | DIASTOLIC BLOOD PRESSURE: 60 MMHG

## 2019-07-05 DIAGNOSIS — L81.9 DISCOLORATION OF SKIN OF TOE: Primary | ICD-10-CM

## 2019-07-05 PROCEDURE — G8427 DOCREV CUR MEDS BY ELIG CLIN: HCPCS | Performed by: FAMILY MEDICINE

## 2019-07-05 PROCEDURE — 4040F PNEUMOC VAC/ADMIN/RCVD: CPT | Performed by: FAMILY MEDICINE

## 2019-07-05 PROCEDURE — G8400 PT W/DXA NO RESULTS DOC: HCPCS | Performed by: FAMILY MEDICINE

## 2019-07-05 PROCEDURE — 3017F COLORECTAL CA SCREEN DOC REV: CPT | Performed by: FAMILY MEDICINE

## 2019-07-05 PROCEDURE — G8420 CALC BMI NORM PARAMETERS: HCPCS | Performed by: FAMILY MEDICINE

## 2019-07-05 PROCEDURE — 1090F PRES/ABSN URINE INCON ASSESS: CPT | Performed by: FAMILY MEDICINE

## 2019-07-05 PROCEDURE — 1123F ACP DISCUSS/DSCN MKR DOCD: CPT | Performed by: FAMILY MEDICINE

## 2019-07-05 PROCEDURE — 99213 OFFICE O/P EST LOW 20 MIN: CPT | Performed by: FAMILY MEDICINE

## 2019-07-05 ASSESSMENT — ENCOUNTER SYMPTOMS
SINUS PRESSURE: 0
SHORTNESS OF BREATH: 0
CONSTIPATION: 0

## 2019-07-08 ENCOUNTER — HOSPITAL ENCOUNTER (OUTPATIENT)
Dept: CT IMAGING | Age: 74
Discharge: HOME | End: 2019-07-08
Payer: MEDICARE

## 2019-07-08 ENCOUNTER — TELEPHONE (OUTPATIENT)
Dept: FAMILY MEDICINE CLINIC | Age: 74
End: 2019-07-08

## 2019-07-08 ENCOUNTER — HOSPITAL ENCOUNTER (OUTPATIENT)
Dept: INTERVENTIONAL RADIOLOGY/VASCULAR | Age: 74
Discharge: HOME | End: 2019-07-08
Payer: MEDICARE

## 2019-07-08 DIAGNOSIS — R10.9 LEFT FLANK PAIN: ICD-10-CM

## 2019-07-08 DIAGNOSIS — R09.89 OTHER SPECIFIED SYMPTOMS AND SIGNS INVOLVING THE CIRCULATORY AND RESPIRATORY SYSTEMS: ICD-10-CM

## 2019-07-08 DIAGNOSIS — R31.9 HEMATURIA, UNSPECIFIED TYPE: ICD-10-CM

## 2019-07-08 DIAGNOSIS — L81.9 DISCOLORATION OF SKIN OF TOE: ICD-10-CM

## 2019-07-08 PROBLEM — N28.1 RENAL CYST, ACQUIRED, RIGHT: Status: ACTIVE | Noted: 2019-07-01

## 2019-07-08 PROBLEM — M85.80 OSTEOPENIA: Status: ACTIVE | Noted: 2019-07-01

## 2019-07-08 PROCEDURE — 74178 CT ABD&PLV WO CNTR FLWD CNTR: CPT

## 2019-07-08 PROCEDURE — 6360000004 HC RX CONTRAST MEDICATION: Performed by: FAMILY MEDICINE

## 2019-07-08 PROCEDURE — 93922 UPR/L XTREMITY ART 2 LEVELS: CPT

## 2019-07-08 RX ADMIN — IOPAMIDOL 85 ML: 755 INJECTION, SOLUTION INTRAVENOUS at 07:43

## 2019-07-17 ENCOUNTER — TELEPHONE (OUTPATIENT)
Dept: FAMILY MEDICINE CLINIC | Age: 74
End: 2019-07-17

## 2019-08-29 ENCOUNTER — TELEPHONE (OUTPATIENT)
Dept: FAMILY MEDICINE CLINIC | Age: 74
End: 2019-08-29

## 2019-10-04 ENCOUNTER — OFFICE VISIT (OUTPATIENT)
Dept: FAMILY MEDICINE CLINIC | Age: 74
End: 2019-10-04

## 2019-10-04 VITALS
DIASTOLIC BLOOD PRESSURE: 78 MMHG | RESPIRATION RATE: 12 BRPM | WEIGHT: 124.6 LBS | HEART RATE: 56 BPM | HEIGHT: 60 IN | SYSTOLIC BLOOD PRESSURE: 128 MMHG | BODY MASS INDEX: 24.46 KG/M2

## 2019-10-04 PROCEDURE — 1090F PRES/ABSN URINE INCON ASSESS: CPT | Performed by: FAMILY MEDICINE

## 2019-10-04 PROCEDURE — 4040F PNEUMOC VAC/ADMIN/RCVD: CPT | Performed by: FAMILY MEDICINE

## 2019-10-04 PROCEDURE — G8420 CALC BMI NORM PARAMETERS: HCPCS | Performed by: FAMILY MEDICINE

## 2019-10-04 PROCEDURE — 1123F ACP DISCUSS/DSCN MKR DOCD: CPT | Performed by: FAMILY MEDICINE

## 2019-10-04 PROCEDURE — G8400 PT W/DXA NO RESULTS DOC: HCPCS | Performed by: FAMILY MEDICINE

## 2019-10-04 PROCEDURE — 99213 OFFICE O/P EST LOW 20 MIN: CPT | Performed by: FAMILY MEDICINE

## 2019-10-04 PROCEDURE — G8427 DOCREV CUR MEDS BY ELIG CLIN: HCPCS | Performed by: FAMILY MEDICINE

## 2019-10-04 ASSESSMENT — ENCOUNTER SYMPTOMS
ABDOMINAL PAIN: 0
NAUSEA: 0
DIARRHEA: 0
CONSTIPATION: 0
CHEST TIGHTNESS: 0
EYES NEGATIVE: 1
VOMITING: 0
COUGH: 0
SHORTNESS OF BREATH: 0

## 2019-10-04 NOTE — PROGRESS NOTES
Date: 10/4/2019    Niki Rosa is a 66 y.o. female who presents today for:  Chief Complaint   Patient presents with    Laceration     Left arm       HPI:     HPI    has a current medication list which includes the following prescription(s): pravastatin, b complex vitamins, vitamin d3, flaxseed (linseed), omega-3 fatty acids, and coenzyme q10. Allergies   Allergen Reactions    Atorvastatin Other (See Comments)     Cramping, heartburn and diarrhea       Social History     Tobacco Use    Smoking status: Current Every Day Smoker     Packs/day: 0.60     Years: 50.00     Pack years: 30.00     Types: Cigarettes    Smokeless tobacco: Never Used   Substance Use Topics    Alcohol use: No    Drug use: No       Past Medical History:   Diagnosis Date    Hyperlipidemia     Low back pain 6/26/2015    Osteopenia 07/2019    Renal cyst, acquired, right 07/2019    Vitamin D deficiency        Past Surgical History:   Procedure Laterality Date    PILONIDAL CYST EXCISION  1972    TONSILLECTOMY         Family History   Problem Relation Age of Onset    Heart Disease Mother     Emphysema Mother     Heart Disease Father      Subjective:     Review of Systems   Constitutional: Negative for activity change, appetite change, diaphoresis, fatigue and fever. HENT: Negative. Eyes: Negative. Respiratory: Negative for cough, chest tightness and shortness of breath. Cardiovascular: Negative for chest pain, palpitations and leg swelling. Gastrointestinal: Negative for abdominal pain, constipation, diarrhea, nausea and vomiting. Genitourinary: Negative. Musculoskeletal: Negative. Skin: Negative. Negative for rash. Neurological: Negative for dizziness, syncope, weakness, light-headedness, numbness and headaches.    Psychiatric/Behavioral: Negative.        :   /78 (Site: Right Upper Arm, Position: Sitting, Cuff Size: Medium Adult)   Pulse 56   Resp 12   Ht 5' (1.524 m)   Wt 124 lb 9.6 oz (56.5 kg) BMI 24.33 kg/m²   Wt Readings from Last 3 Encounters:   10/04/19 124 lb 9.6 oz (56.5 kg)   07/05/19 125 lb 8 oz (56.9 kg)   06/24/19 124 lb 2 oz (56.3 kg)     Physical Exam   Constitutional: She is oriented to person, place, and time. She appears well-developed and well-nourished. No distress. HENT:   Head: Normocephalic and atraumatic. Eyes: Pupils are equal, round, and reactive to light. Conjunctivae and EOM are normal. Right eye exhibits no discharge. Left eye exhibits no discharge. No scleral icterus. Neck: Normal range of motion. Neck supple. No JVD present. No thyromegaly present. Cardiovascular: Normal rate, regular rhythm and normal heart sounds. No murmur heard. Pulmonary/Chest: Breath sounds normal. No respiratory distress. She has no wheezes. She has no rhonchi. She has no rales. Abdominal: Soft. Bowel sounds are normal. She exhibits no distension and no mass. There is no hepatosplenomegaly. There is no tenderness. There is no rebound and no guarding. Musculoskeletal: Normal range of motion. Lymphadenopathy:     She has no cervical adenopathy. Neurological: She is alert and oriented to person, place, and time. Skin: Skin is warm and dry. No rash noted. She is not diaphoretic. Psychiatric: She has a normal mood and affect. Her behavior is normal.   Nursing note and vitals reviewed.        :       Diagnosis Orders   1. Laceration of left forearm, initial encounter     2. Easy bruising  APTT    CBC    Protime-INR   3. Bruising  APTT    CBC    Protime-INR       :      Requested Prescriptions      No prescriptions requested or ordered in this encounter     Current Outpatient Medications   Medication Sig Dispense Refill    pravastatin (PRAVACHOL) 40 MG tablet Take 1 tablet by mouth daily 90 tablet 3    B Complex Vitamins (B-COMPLEX/B-12 PO) Take by mouth      Cholecalciferol (VITAMIN D3) 5000 UNITS CAPS Take 1 capsule by mouth daily.       Flaxseed, Linseed, (FLAX SEED OIL PO) Take by mouth.  Omega-3 Fatty Acids (FISH OIL PO) Take  by mouth.  Coenzyme Q10 (CO Q 10 PO) Take  by mouth. No current facility-administered medications for this visit. Orders Placed This Encounter   Procedures    APTT     Standing Status:   Future     Standing Expiration Date:   10/4/2020     Order Specific Question:   Daily Heparin Dose? Answer:   none    CBC     Standing Status:   Future     Standing Expiration Date:   10/4/2020    Protime-INR     Standing Status:   Future     Standing Expiration Date:   10/4/2020     Order Specific Question:   Daily Coumadin Dose? Answer:   none       Continue current medications. Return in about 1 week (around 10/11/2019). Discussed use, benefit, and side effects of prescribed medications. All patient questions answered. Pt voiced understanding. Instructed to continue current medications,diet and exercise. Patient agreed with treatment plan.

## 2019-10-07 LAB
EOSINOPHIL # BLD: 3 %
EOSINOPHILS ABSOLUTE: 0.3 X10E9/L (ref 0–0.4)
HCT VFR BLD CALC: 46.8 % (ref 34–48)
HEMOGLOBIN: 15.9 G/DL (ref 11.7–16.1)
INR BLD: 1 (ref 0.9–1.2)
LYMPHOCYTES # BLD: 36 %
LYMPHOCYTES ABSOLUTE: 3.1 X10E9/L (ref 1–3.5)
MCH RBC QN AUTO: 34.9 PG (ref 27–35)
MCHC RBC AUTO-ENTMCNC: 33.9 G/DL (ref 31–36)
MCV RBC AUTO: 103 FL (ref 81–101)
MONOCYTES # BLD: 7 %
MONOCYTES ABSOLUTE: 0.6 X10E9/L (ref 0–0.9)
NEUTROPHILS ABSOLUTE: 4.7 X10E9/L (ref 1.5–6.6)
NEUTROPHILS RELATIVE PERCENT: 54 %
PDW BLD-RTO: 13.2 % (ref 11.5–14.7)
PLATELETS: 325 X10E9/L (ref 150–450)
PMV BLD AUTO: 8.8 FL (ref 7–12)
PROTHROMBIN TIME: 10.8 SEC (ref 9.5–12.6)
RBC # BLD: ABNORMAL 10*6/UL
RBC: 4.54 X10E12/L (ref 3.3–5.5)
WBC: 8.7 X10E9/L (ref 4–11.8)

## 2019-10-10 ENCOUNTER — OFFICE VISIT (OUTPATIENT)
Dept: FAMILY MEDICINE CLINIC | Age: 74
End: 2019-10-10

## 2019-10-10 VITALS
BODY MASS INDEX: 24.19 KG/M2 | DIASTOLIC BLOOD PRESSURE: 60 MMHG | SYSTOLIC BLOOD PRESSURE: 122 MMHG | HEART RATE: 72 BPM | HEIGHT: 60 IN | WEIGHT: 123.19 LBS | RESPIRATION RATE: 10 BRPM

## 2019-10-10 DIAGNOSIS — S51.812A LACERATION OF LEFT FOREARM, INITIAL ENCOUNTER: Primary | ICD-10-CM

## 2019-10-10 PROCEDURE — 1123F ACP DISCUSS/DSCN MKR DOCD: CPT | Performed by: FAMILY MEDICINE

## 2019-10-10 PROCEDURE — 4040F PNEUMOC VAC/ADMIN/RCVD: CPT | Performed by: FAMILY MEDICINE

## 2019-10-10 PROCEDURE — G8400 PT W/DXA NO RESULTS DOC: HCPCS | Performed by: FAMILY MEDICINE

## 2019-10-10 PROCEDURE — G8420 CALC BMI NORM PARAMETERS: HCPCS | Performed by: FAMILY MEDICINE

## 2019-10-10 PROCEDURE — G8427 DOCREV CUR MEDS BY ELIG CLIN: HCPCS | Performed by: FAMILY MEDICINE

## 2019-10-10 PROCEDURE — 1090F PRES/ABSN URINE INCON ASSESS: CPT | Performed by: FAMILY MEDICINE

## 2019-10-10 PROCEDURE — 99213 OFFICE O/P EST LOW 20 MIN: CPT | Performed by: FAMILY MEDICINE

## 2019-10-10 ASSESSMENT — ENCOUNTER SYMPTOMS
SINUS PRESSURE: 0
CONSTIPATION: 0
SHORTNESS OF BREATH: 0

## 2019-10-21 ENCOUNTER — TELEPHONE (OUTPATIENT)
Dept: FAMILY MEDICINE CLINIC | Age: 74
End: 2019-10-21

## 2020-07-21 ENCOUNTER — TELEPHONE (OUTPATIENT)
Dept: FAMILY MEDICINE CLINIC | Age: 75
End: 2020-07-21

## 2020-07-21 RX ORDER — PRAVASTATIN SODIUM 40 MG
40 TABLET ORAL DAILY
Qty: 90 TABLET | Refills: 0 | Status: SHIPPED | OUTPATIENT
Start: 2020-07-21 | End: 2020-12-22 | Stop reason: SDUPTHER

## 2020-07-21 NOTE — TELEPHONE ENCOUNTER
Patient called concerned about Covid and getting labs done. She knows she needs to get her cholesterol checked and she hasn't been taking it since she has run out and hasn't gotten it checked. She wanted to know if she really had to get it done before a new RX could be called in. If she has to get it done she would like the orders to go to [facility] on [address]    Please call her back.

## 2020-07-21 NOTE — TELEPHONE ENCOUNTER
If she has been not taking the med then the lab would have no worth. I will send a 90 day Rx and she should do the fasting labs after being back on the med for 2 months.

## 2020-11-09 ENCOUNTER — TELEPHONE (OUTPATIENT)
Dept: FAMILY MEDICINE CLINIC | Age: 75
End: 2020-11-09

## 2020-11-09 ENCOUNTER — HOSPITAL ENCOUNTER (OUTPATIENT)
Age: 75
Setting detail: SPECIMEN
Discharge: HOME | End: 2020-11-09
Payer: MEDICARE

## 2020-11-09 PROCEDURE — U0003 INFECTIOUS AGENT DETECTION BY NUCLEIC ACID (DNA OR RNA); SEVERE ACUTE RESPIRATORY SYNDROME CORONAVIRUS 2 (SARS-COV-2) (CORONAVIRUS DISEASE [COVID-19]), AMPLIFIED PROBE TECHNIQUE, MAKING USE OF HIGH THROUGHPUT TECHNOLOGIES AS DESCRIBED BY CMS-2020-01-R: HCPCS

## 2020-11-09 NOTE — TELEPHONE ENCOUNTER
I will place the order for the swab, but for the stuffiness there is not a Rx med. She could continue the OTC meds and increase the water intake.

## 2020-11-09 NOTE — TELEPHONE ENCOUNTER
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 She is not having any new symptoms since she was exposed. Said that she has had stuffiness for a few weeks. She is wanting tested for peace of mind and her family is concerned. Kat Courtney that she does still work a couple days a week. She would want to go to [facility]. Also wondering what she could take for the stuffiness. Kat Courtney has tried things OTC and nothing is really helping. Has tried Benadryl, Tylenol Cold & Flu, Michelle seltser tablets also.

## 2020-11-11 ENCOUNTER — TELEPHONE (OUTPATIENT)
Dept: FAMILY MEDICINE CLINIC | Age: 75
End: 2020-11-11

## 2020-11-11 LAB — SARS-COV-2: NOT DETECTED

## 2020-11-12 NOTE — TELEPHONE ENCOUNTER
Pt informed. Said she has a lot of nasal congestion, sneezing and some runny nose. Is there anything she can take, even OTC? I did tell her to check with the pharmacy but that I would put a message back to doctor as well.

## 2020-12-22 ENCOUNTER — TELEPHONE (OUTPATIENT)
Dept: FAMILY MEDICINE CLINIC | Age: 75
End: 2020-12-22

## 2020-12-22 RX ORDER — PRAVASTATIN SODIUM 40 MG
40 TABLET ORAL DAILY
Qty: 90 TABLET | Refills: 3 | Status: SHIPPED | OUTPATIENT
Start: 2020-12-22 | End: 2022-01-24 | Stop reason: SDUPTHER

## 2020-12-22 NOTE — TELEPHONE ENCOUNTER
Pt called said she is out Pravastatin 40 mg one tablet daily, has been out about a month or two. She also wants to know if she should get labs done now? If so send orders to [facility].     [facility]    Pt did schedule an Appt 1/26/21

## 2020-12-22 NOTE — TELEPHONE ENCOUNTER
It would not be helpful to check the lipids for 3 months. I will order that and renew the pravastatin.

## 2021-06-07 LAB
ALBUMIN SERPL-MCNC: 4.1 G/DL
ALP BLD-CCNC: 59 U/L
ALT SERPL-CCNC: 19 U/L
AST SERPL-CCNC: 17 U/L
BILIRUB SERPL-MCNC: 0.4 MG/DL (ref 0.1–1.4)
BUN BLDV-MCNC: 25 MG/DL
CALCIUM SERPL-MCNC: 9.3 MG/DL
CHLORIDE BLD-SCNC: 105 MMOL/L
CHOLESTEROL, FASTING: 282
CO2: 28 MMOL/L
CREAT SERPL-MCNC: 0.73 MG/DL
GLUCOSE FASTING: 99 MG/DL
HDLC SERPL-MCNC: 79 MG/DL (ref 35–70)
LDL CHOLESTEROL CALCULATED: 184 MG/DL (ref 0–160)
POTASSIUM SERPL-SCNC: 4.6 MMOL/L
SODIUM BLD-SCNC: 142 MMOL/L
TOTAL PROTEIN: 6.8 G/DL (ref 6.4–8.2)
TRIGLYCERIDE, FASTING: 94

## 2021-06-08 LAB
ALBUMIN SERPL-MCNC: 4.1 G/DL (ref 3.2–5.3)
ALK PHOSPHATASE: 59 U/L (ref 39–130)
ALT SERPL-CCNC: 19 U/L (ref 0–31)
ANION GAP SERPL CALCULATED.3IONS-SCNC: 9 MMOL/L (ref 5–15)
AST SERPL-CCNC: 17 U/L (ref 0–41)
BILIRUB SERPL-MCNC: 0.4 MG/DL (ref 0.3–1.2)
BUN BLDV-MCNC: 25 MG/DL (ref 5–27)
CALCIUM SERPL-MCNC: 9.3 MG/DL (ref 8.5–10.5)
CHLORIDE BLD-SCNC: 105 MMOL/L (ref 98–109)
CHOLESTEROL/HDL RATIO: 3.6 (ref 1–5)
CHOLESTEROL: 282 MG/DL (ref 150–200)
CO2: 28 MMOL/L (ref 22–32)
CREAT SERPL-MCNC: 0.73 MG/DL (ref 0.4–1)
EGFR AFRICAN AMERICAN: >60 ML/MIN/1.73SQ.M
EGFR IF NONAFRICAN AMERICAN: >60 ML/MIN/1.73SQ.M
GLUCOSE: 99 MG/DL (ref 65–99)
HDLC SERPL-MCNC: 79 MG/DL
LDL CHOLESTEROL CALCULATED: 184 MG/DL
LDL/HDL RATIO: 2.3
POTASSIUM SERPL-SCNC: 4.6 MMOL/L (ref 3.5–5)
SODIUM BLD-SCNC: 142 MMOL/L (ref 134–146)
TOTAL PROTEIN: 6.8 G/DL (ref 6–8)
TRIGL SERPL-MCNC: 94 MG/DL (ref 27–150)
VLDLC SERPL CALC-MCNC: 19 MG/DL (ref 0–30)

## 2021-06-24 ENCOUNTER — TELEPHONE (OUTPATIENT)
Dept: FAMILY MEDICINE CLINIC | Age: 76
End: 2021-06-24

## 2021-06-24 DIAGNOSIS — E78.00 PURE HYPERCHOLESTEROLEMIA: ICD-10-CM

## 2021-06-24 NOTE — TELEPHONE ENCOUNTER
Received lab results and they were scanned into chart. Pt also did ask about a Pravastatin refill. I told her that the pharmacy had refills and she should call them. She did state also that she has not been taking the Pravastatin for a few weeks.

## 2021-06-24 NOTE — TELEPHONE ENCOUNTER
Pt called said that she had labs drawn at [facility] around 6/7/21. I called [facility] [phone] and spoke with Krishna Morales who said she did not have any results for June 2021. I called pt back and let her know. She then called me back and said she called [facility] and they did find the results and they are going to fax the results to us.

## 2021-08-24 ENCOUNTER — OFFICE VISIT (OUTPATIENT)
Dept: FAMILY MEDICINE CLINIC | Age: 76
End: 2021-08-24

## 2021-08-24 ENCOUNTER — TELEPHONE (OUTPATIENT)
Dept: FAMILY MEDICINE CLINIC | Age: 76
End: 2021-08-24

## 2021-08-24 VITALS
RESPIRATION RATE: 14 BRPM | TEMPERATURE: 96.5 F | DIASTOLIC BLOOD PRESSURE: 70 MMHG | HEART RATE: 80 BPM | SYSTOLIC BLOOD PRESSURE: 138 MMHG | HEIGHT: 60 IN | BODY MASS INDEX: 23.36 KG/M2 | WEIGHT: 119 LBS

## 2021-08-24 DIAGNOSIS — E55.9 VITAMIN D DEFICIENCY: Primary | ICD-10-CM

## 2021-08-24 DIAGNOSIS — Z12.31 VISIT FOR SCREENING MAMMOGRAM: ICD-10-CM

## 2021-08-24 DIAGNOSIS — Z78.0 POST-MENOPAUSAL: ICD-10-CM

## 2021-08-24 DIAGNOSIS — Z00.00 ROUTINE GENERAL MEDICAL EXAMINATION AT A HEALTH CARE FACILITY: ICD-10-CM

## 2021-08-24 DIAGNOSIS — E78.00 PURE HYPERCHOLESTEROLEMIA: ICD-10-CM

## 2021-08-24 PROCEDURE — G8400 PT W/DXA NO RESULTS DOC: HCPCS | Performed by: FAMILY MEDICINE

## 2021-08-24 PROCEDURE — 1090F PRES/ABSN URINE INCON ASSESS: CPT | Performed by: FAMILY MEDICINE

## 2021-08-24 PROCEDURE — G0438 PPPS, INITIAL VISIT: HCPCS | Performed by: FAMILY MEDICINE

## 2021-08-24 PROCEDURE — G8427 DOCREV CUR MEDS BY ELIG CLIN: HCPCS | Performed by: FAMILY MEDICINE

## 2021-08-24 PROCEDURE — G8420 CALC BMI NORM PARAMETERS: HCPCS | Performed by: FAMILY MEDICINE

## 2021-08-24 PROCEDURE — 1123F ACP DISCUSS/DSCN MKR DOCD: CPT | Performed by: FAMILY MEDICINE

## 2021-08-24 PROCEDURE — 4040F PNEUMOC VAC/ADMIN/RCVD: CPT | Performed by: FAMILY MEDICINE

## 2021-08-24 SDOH — ECONOMIC STABILITY: FOOD INSECURITY: WITHIN THE PAST 12 MONTHS, YOU WORRIED THAT YOUR FOOD WOULD RUN OUT BEFORE YOU GOT MONEY TO BUY MORE.: NEVER TRUE

## 2021-08-24 SDOH — ECONOMIC STABILITY: FOOD INSECURITY: WITHIN THE PAST 12 MONTHS, THE FOOD YOU BOUGHT JUST DIDN'T LAST AND YOU DIDN'T HAVE MONEY TO GET MORE.: NEVER TRUE

## 2021-08-24 ASSESSMENT — PATIENT HEALTH QUESTIONNAIRE - PHQ9
SUM OF ALL RESPONSES TO PHQ QUESTIONS 1-9: 0
SUM OF ALL RESPONSES TO PHQ QUESTIONS 1-9: 0
1. LITTLE INTEREST OR PLEASURE IN DOING THINGS: 0
SUM OF ALL RESPONSES TO PHQ QUESTIONS 1-9: 0
2. FEELING DOWN, DEPRESSED OR HOPELESS: 0
SUM OF ALL RESPONSES TO PHQ9 QUESTIONS 1 & 2: 0

## 2021-08-24 ASSESSMENT — LIFESTYLE VARIABLES: HOW OFTEN DO YOU HAVE A DRINK CONTAINING ALCOHOL: 0

## 2021-08-24 ASSESSMENT — SOCIAL DETERMINANTS OF HEALTH (SDOH): HOW HARD IS IT FOR YOU TO PAY FOR THE VERY BASICS LIKE FOOD, HOUSING, MEDICAL CARE, AND HEATING?: NOT HARD AT ALL

## 2021-08-24 NOTE — PATIENT INSTRUCTIONS
Consider getting in touch with Lorenzo Ly for the [facility] RASHI therapy. Check about acupuncture and laser to the back  Personalized Preventive Plan for Beni Oquendo - 8/24/2021  Medicare offers a range of preventive health benefits. Some of the tests and screenings are paid in full while other may be subject to a deductible, co-insurance, and/or copay. Some of these benefits include a comprehensive review of your medical history including lifestyle, illnesses that may run in your family, and various assessments and screenings as appropriate. After reviewing your medical record and screening and assessments performed today your provider may have ordered immunizations, labs, imaging, and/or referrals for you. A list of these orders (if applicable) as well as your Preventive Care list are included within your After Visit Summary for your review. Other Preventive Recommendations:    · A preventive eye exam performed by an eye specialist is recommended every 1-2 years to screen for glaucoma; cataracts, macular degeneration, and other eye disorders. · A preventive dental visit is recommended every 6 months. · Try to get at least 150 minutes of exercise per week or 10,000 steps per day on a pedometer . · Order or download the FREE \"Exercise & Physical Activity: Your Everyday Guide\" from The [employer]. Call 7-820.124.8391 or search [employer] online. · You need 7584-1731 mg of calcium and 2396-0528 IU of vitamin D per day. It is possible to meet your calcium requirement with diet alone, but a vitamin D supplement is usually necessary to meet this goal.  · When exposed to the sun, use a sunscreen that protects against both UVA and UVB radiation with an SPF of 30 or greater. Reapply every 2 to 3 hours or after sweating, drying off with a towel, or swimming. · Always wear a seat belt when traveling in a car. Always wear a helmet when riding a bicycle or motorcycle.

## 2021-08-24 NOTE — PROGRESS NOTES
Medicare Annual Wellness Visit  Name: Keon Ash Date: 2021   MRN: [id number] Sex: Female   Age: 66 y.o. Ethnicity: Non- / Non    : 1945 Race: White (non-)      Ge Ro is here for Medicare AWV    Screenings for behavioral, psychosocial and functional/safety risks, and cognitive dysfunction are all negative except as indicated below. These results, as well as other patient data from the [address] form, are documented in Flowsheets linked to this Encounter. Allergies   Allergen Reactions    Atorvastatin Other (See Comments)     Cramping, heartburn and diarrhea       Prior to Visit Medications    Medication Sig Taking? Authorizing Provider   pravastatin (PRAVACHOL) 40 MG tablet Take 1 tablet by mouth daily Yes Johanna Lin MD   Cholecalciferol (VITAMIN D3) 5000 UNITS CAPS Take 1 capsule by mouth daily. Yes Johanna Lin MD   Flaxseed, Linseed, (FLAX SEED OIL PO) Take  by mouth. Yes Historical Provider, MD   Omega-3 Fatty Acids (FISH OIL PO) Take  by mouth. Yes Historical Provider, MD   Coenzyme Q10 (CO Q 10 PO) Take  by mouth.  Yes Historical Provider, MD   B Complex Vitamins (B-COMPLEX/B-12 PO) Take by mouth  Patient not taking: Reported on 2021  Historical Provider, MD       Past Medical History:   Diagnosis Date    Hyperlipidemia     Low back pain 2015    Osteopenia 2019    Renal cyst, acquired, right 2019    Vitamin D deficiency        Past Surgical History:   Procedure Laterality Date    PILONIDAL CYST EXCISION  1972    TONSILLECTOMY         Family History   Problem Relation Age of Onset    Heart Disease Mother     Emphysema Mother     Heart Disease Father        CareTeam (Including outside providers/suppliers regularly involved in providing care):   Patient Care Team:  Johanna Lin MD as PCP - General (Family Medicine)  Johanna Lin MD as PCP - [facility] Empaneled Provider    Wt Readings from Last 3 Encounters:   08/24/21 119 lb (54 kg)   10/10/19 123 lb 3 oz (55.9 kg)   10/04/19 124 lb 9.6 oz (56.5 kg)     Vitals:    08/24/21 1341   BP: 138/70   Site: Right Upper Arm   Position: Sitting   Cuff Size: Medium Adult   Pulse: 80   Resp: 14   Temp: 96.5 °F (35.8 °C)   TempSrc: Skin   Weight: 119 lb (54 kg)   Height: 5' (1.524 m)     Body mass index is 23.24 kg/m². Based upon direct observation of the patient, evaluation of cognition reveals recent and remote memory intact. 1. She's had back pain for over 2 years. She's had PT, chiropractor, heat and ice and patches. Each day is different but it's overall the same. 2. She doesn't want colonoscopy or cologuard  General Appearance: alert and oriented to person, place and time, well-developed and well-nourished, in no acute distress  Skin: warm and dry, no rash or erythema  Head: normocephalic and atraumatic  Eyes: pupils equal, round, and reactive to light, extraocular eye movements intact, conjunctivae normal  ENT: tympanic membrane, external ear and ear canal normal bilaterally, oropharynx clear and moist with normal mucous membranes  Neck: neck supple and non tender without mass, no thyromegaly or thyroid nodules, no cervical lymphadenopathy   Pulmonary/Chest: clear to auscultation bilaterally- no wheezes, rales or rhonchi, normal air movement, no respiratory distress  Cardiovascular: normal rate, regular rhythm, normal S1 and S2, no murmurs, no gallops, intact distal pulses and no carotid bruits  Abdomen: soft, non-tender, non-distended, normal bowel sounds, no masses or organomegaly  Extremities: no cyanosis, no clubbing and no edema  Musculoskeletal: normal range of motion, no joint swelling, deformity or tenderness  Neurologic: gait and coordination normal and speech normal    Patient's complete Health Risk Assessment and screening values have been reviewed and are found in Flowsheets.  The following problems were reviewed today and where indicated follow up appointments were made and/or referrals ordered. Positive Risk Factor Screenings with Interventions:         Substance History:  Social History     Tobacco History     Smoking Status  Current Every Day Smoker Smoking Frequency  0.6 packs/day for 50 years (30 pk yrs) Smoking Tobacco Type  Cigarettes    Smokeless Tobacco Use  Never Used          Alcohol History     Alcohol Use Status  No          Drug Use     Drug Use Status  No          Sexual Activity     Sexually Active  Not Asked               Alcohol Screening:       A score of 8 or more is associated with harmful or hazardous drinking. A score of 13 or more in women, and 15 or more in men, is likely to indicate alcohol dependence. Substance Abuse Interventions:  · n/a    General Health and ACP:  General  In general, how would you say your health is?: Very Good  In the past 7 days, have you experienced any of the following?  New or Increased Pain, New or Increased Fatigue, Loneliness, Social Isolation, Stress or Anger?: None of These  Do you get the social and emotional support that you need?: Yes  Do you have a Living Will?: (!) No  Advance Directives     Power of [address] [place] [address] Will ACP-Advance Directive ACP-Power of     Not on File Not on File Not on File Not on File      General Health Risk Interventions:  · No Living Will: we discussed it's use    Health Habits/Nutrition:  Health Habits/Nutrition  Do you exercise for at least 20 minutes 2-3 times per week?: (!) No  Have you lost any weight without trying in the past 3 months?: No  Do you eat only one meal per day?: No  Have you seen the dentist within the past year?: (!) No  Body mass index: 23.24  Health Habits/Nutrition Interventions:  · Dental exam overdue:  patient encouraged to make appointment with his/her dentist  · he back bothers her too much    Hearing/Vision:  No exam data present  Hearing/Vision  Do you or your family notice any trouble with your hearing that hasn't been managed with hearing aids?: (!) Yes  Do you have difficulty driving, watching TV, or doing any of your daily activities because of your eyesight?: No  Have you had an eye exam within the past year?: (!) No  Hearing/Vision Interventions:  · Vision concerns:  patient encouraged to make appointment with his/her eye specialist      Personalized Preventive Plan   Current Health Maintenance Status  Immunization History   Administered Date(s) Administered    COVID-19, Ramos Peter, PF, 30mcg/0.3mL 02/02/2021, 03/02/2021    Influenza Virus Vaccine 09/30/2019    Influenza, Miguel Fuller, IM, (6 mo and older Fluzone, Flulaval, Fluarix and 3 yrs and older Afluria) 10/02/2017        Health Maintenance   Topic Date Due    Hepatitis C screen  Never done    DTaP/Tdap/Td vaccine (1 - Tdap) Never done    Shingles Vaccine (1 of 2) Never done    Low dose CT lung screening  Never done    Pneumococcal 65+ years Vaccine (1 of 1 - PPSV23) Never done   [employer] Visit (AWV)  Never done    Breast cancer screen  07/03/2021    Flu vaccine (1) 09/01/2021    Lipid screen  06/07/2022    DEXA (modify frequency per FRAX score)  Completed    COVID-19 Vaccine  Completed    Hepatitis A vaccine  Aged Out    Hepatitis B vaccine  Aged Out    Hib vaccine  Aged Out    Meningococcal (ACWY) vaccine  Aged Out     Recommendations for "[employer] Due: see orders and patient instructions/AVS.  . Recommended screening schedule for the next 5-10 years is provided to the patient in written form: see Patient Instructions/AVS.    There are no diagnoses linked to this encounter.

## 2021-08-24 NOTE — TELEPHONE ENCOUNTER
Mammogram and dexa scheduled for 8/31/2021 at [facility] shalom tellez. Patient to arrive at 200pm for a 230pm appt. No tanner,lotion,perfume. No multivitamin or ca for 24hrs. Faxed orders to [facility].   MOM to return call to office

## 2021-10-26 DIAGNOSIS — E55.9 VITAMIN D DEFICIENCY: Primary | ICD-10-CM

## 2021-10-26 DIAGNOSIS — E78.00 PURE HYPERCHOLESTEROLEMIA: ICD-10-CM

## 2021-10-26 LAB
LDL CHOLESTEROL DIRECT: 126 MG/DL
VITAMIN D 25-HYDROXY: 37.7 NG/ML (ref 30–100)

## 2021-10-27 ENCOUNTER — TELEPHONE (OUTPATIENT)
Dept: FAMILY MEDICINE CLINIC | Age: 76
End: 2021-10-27

## 2021-10-27 NOTE — TELEPHONE ENCOUNTER
----- Message from Rosie Henry MD sent at 10/26/2021  8:59 PM EDT -----  Let her know the labs were ok and to continue the current meds.  Check the fasting labs again in late June

## 2021-11-05 DIAGNOSIS — E55.9 VITAMIN D DEFICIENCY: Primary | ICD-10-CM

## 2021-11-05 DIAGNOSIS — M81.0 AGE-RELATED OSTEOPOROSIS WITHOUT CURRENT PATHOLOGICAL FRACTURE: ICD-10-CM

## 2021-11-05 RX ORDER — DENOSUMAB 60 MG/ML
60 INJECTION SUBCUTANEOUS ONCE
Qty: 180 ML | Refills: 1 | Status: SHIPPED | OUTPATIENT
Start: 2021-11-05 | End: 2022-07-06

## 2021-11-08 ENCOUNTER — TELEPHONE (OUTPATIENT)
Dept: FAMILY MEDICINE CLINIC | Age: 76
End: 2021-11-08

## 2021-11-08 NOTE — TELEPHONE ENCOUNTER
----- Message from Darrell Stubbs MD sent at 11/5/2021  5:58 PM EDT -----  Let her know that the bone density in the right hip showed osteoporosis. Is she still on the vit D3 at 5000 units once daily? Is she on a daily calcium pill? It would be good to be on 1200 mg of calcium. She should begin some prolia. I  will order the needed non fasting labs.

## 2022-01-24 DIAGNOSIS — E78.00 PURE HYPERCHOLESTEROLEMIA: ICD-10-CM

## 2022-01-24 RX ORDER — PRAVASTATIN SODIUM 40 MG
40 TABLET ORAL DAILY
Qty: 90 TABLET | Refills: 3 | Status: SHIPPED | OUTPATIENT
Start: 2022-01-24

## 2022-01-24 NOTE — TELEPHONE ENCOUNTER
Date of last visit:  8/24/2021  Date of next visit:  Visit date not found    Requested Prescriptions     Pending Prescriptions Disp Refills    pravastatin (PRAVACHOL) 40 MG tablet 90 tablet 3     Sig: Take 1 tablet by mouth daily

## 2022-01-24 NOTE — TELEPHONE ENCOUNTER
Pt called to req an refill on her pravastatin (PRAVACHOL) 40 MG tablet    [facility]

## 2022-07-06 ENCOUNTER — OFFICE VISIT (OUTPATIENT)
Dept: FAMILY MEDICINE CLINIC | Age: 77
End: 2022-07-06

## 2022-07-06 VITALS
BODY MASS INDEX: 22.62 KG/M2 | DIASTOLIC BLOOD PRESSURE: 80 MMHG | WEIGHT: 115.2 LBS | RESPIRATION RATE: 12 BRPM | SYSTOLIC BLOOD PRESSURE: 130 MMHG | HEART RATE: 76 BPM | HEIGHT: 60 IN

## 2022-07-06 DIAGNOSIS — L25.9 CONTACT DERMATITIS, UNSPECIFIED CONTACT DERMATITIS TYPE, UNSPECIFIED TRIGGER: Primary | ICD-10-CM

## 2022-07-06 PROCEDURE — G8420 CALC BMI NORM PARAMETERS: HCPCS | Performed by: FAMILY MEDICINE

## 2022-07-06 PROCEDURE — 1090F PRES/ABSN URINE INCON ASSESS: CPT | Performed by: FAMILY MEDICINE

## 2022-07-06 PROCEDURE — 99213 OFFICE O/P EST LOW 20 MIN: CPT | Performed by: FAMILY MEDICINE

## 2022-07-06 PROCEDURE — 1123F ACP DISCUSS/DSCN MKR DOCD: CPT | Performed by: FAMILY MEDICINE

## 2022-07-06 PROCEDURE — G8427 DOCREV CUR MEDS BY ELIG CLIN: HCPCS | Performed by: FAMILY MEDICINE

## 2022-07-06 PROCEDURE — G8400 PT W/DXA NO RESULTS DOC: HCPCS | Performed by: FAMILY MEDICINE

## 2022-07-06 RX ORDER — TRIAMCINOLONE ACETONIDE 40 MG/ML
40 INJECTION, SUSPENSION INTRA-ARTICULAR; INTRAMUSCULAR ONCE
Status: COMPLETED | OUTPATIENT
Start: 2022-07-06 | End: 2022-07-06

## 2022-07-06 RX ORDER — PREDNISONE 10 MG/1
TABLET ORAL
Qty: 16 TABLET | Refills: 0 | Status: SHIPPED | OUTPATIENT
Start: 2022-07-06

## 2022-07-06 RX ADMIN — TRIAMCINOLONE ACETONIDE 40 MG: 40 INJECTION, SUSPENSION INTRA-ARTICULAR; INTRAMUSCULAR at 16:00

## 2022-07-06 ASSESSMENT — ENCOUNTER SYMPTOMS
COUGH: 0
CONSTIPATION: 0
ABDOMINAL PAIN: 0
NAUSEA: 0
SHORTNESS OF BREATH: 0
EYES NEGATIVE: 1
DIARRHEA: 0
VOMITING: 0
CHEST TIGHTNESS: 0

## 2022-07-06 NOTE — PROGRESS NOTES
After obtaining consent, and per orders of Dr. Allie Hyman, injection of Kenalog 40 mg given in Right upper quad. gluteus by Karla Macario MA. Patient instructed to remain in clinic for 20 minutes afterwards, and to report any adverse reaction to me immediately.

## 2022-07-06 NOTE — PROGRESS NOTES
To the note a long-acting maleDate: 7/6/2022    Corey Koch is a 64 y.o. female who presents today for:  Chief Complaint   Patient presents with    Rash since Monday. She was working on her yard. She states that it started on her arms and now it is on her waist, arms, back. It is very itchy. he is using Aveno anti itch cream.        HPI:     HPI    has a current medication list which includes the following prescription(s): pravastatin, vitamin d3, flaxseed (linseed), omega-3 fatty acids, coenzyme q10, prednisone, and hydrocortisone. Allergies   Allergen Reactions    Atorvastatin Other (See Comments)     Cramping, heartburn and diarrhea       Social History     Tobacco Use    Smoking status: Current Every Day Smoker     Packs/day: 0.60     Years: 50.00     Pack years: 30.00     Types: Cigarettes    Smokeless tobacco: Never Used   Substance Use Topics    Alcohol use: No    Drug use: No       Past Medical History:   Diagnosis Date    Age-related osteoporosis without current pathological fracture 09/10/2021    Hyperlipidemia     Low back pain 06/26/2015    Osteopenia 07/2019    Renal cyst, acquired, right 07/2019    Vitamin D deficiency        Past Surgical History:   Procedure Laterality Date    PILONIDAL CYST EXCISION  1972    TONSILLECTOMY         Family History   Problem Relation Age of Onset    Heart Disease Mother     Emphysema Mother     Heart Disease Father      Subjective:     Review of Systems   Constitutional: Negative for activity change, appetite change, diaphoresis, fatigue and fever. HENT: Negative. Eyes: Negative. Respiratory: Negative for cough, chest tightness and shortness of breath. Cardiovascular: Negative for chest pain, palpitations and leg swelling. Gastrointestinal: Negative for abdominal pain, constipation, diarrhea, nausea and vomiting. Genitourinary: Negative. Musculoskeletal: Negative. Skin: Positive for rash.    Neurological: Negative for dizziness, syncope, weakness, light-headedness, numbness and headaches. Psychiatric/Behavioral: Negative.        :   /80 (Site: Right Upper Arm, Position: Sitting, Cuff Size: Medium Adult)   Pulse 76   Resp 12   Ht 5' (1.524 m)   Wt 115 lb 3.2 oz (52.3 kg)   BMI 22.50 kg/m²   Wt Readings from Last 3 Encounters:   22 115 lb 3.2 oz (52.3 kg)   21 119 lb (54 kg)   10/10/19 123 lb 3 oz (55.9 kg)     Physical Exam  Vitals and nursing note reviewed. Constitutional:       General: She is not in acute distress. Appearance: She is well-developed. She is not diaphoretic. HENT:      Head: Normocephalic and atraumatic. Eyes:      General: No scleral icterus. Right eye: No discharge. Left eye: No discharge. Conjunctiva/sclera: Conjunctivae normal.      Pupils: Pupils are equal, round, and reactive to light. Neck:      Thyroid: No thyromegaly. Vascular: No JVD. Cardiovascular:      Rate and Rhythm: Normal rate and regular rhythm. Heart sounds: Normal heart sounds. No murmur heard. Pulmonary:      Effort: No respiratory distress. Breath sounds: Normal breath sounds. No wheezing, rhonchi or rales. Musculoskeletal:         General: Normal range of motion. Skin:     General: Skin is warm and dry. Findings: Rash (on her arms, upper legs, abdomen, back) present. Rash is macular and papular. Neurological:      Mental Status: She is alert and oriented to person, place, and time. Psychiatric:         Behavior: Behavior normal.       :       Diagnosis Orders   1.  Contact dermatitis, unspecified contact dermatitis type, unspecified trigger  triamcinolone acetonide (KENALOG-40) injection 40 mg       :      Requested Prescriptions     Signed Prescriptions Disp Refills    predniSONE (DELTASONE) 10 MG tablet 16 tablet 0     Si tablets 2 times a day for 2 days, then 1 Tablet 2 times a day for 2 days, then 1 tablet daily till gone    hydrocortisone 2.5 % cream 20 g 0     Sig: Apply topically 2 times daily. Current Outpatient Medications   Medication Sig Dispense Refill    pravastatin (PRAVACHOL) 40 MG tablet Take 1 tablet by mouth daily 90 tablet 3    Cholecalciferol (VITAMIN D3) 5000 UNITS CAPS Take 1 capsule by mouth daily.  Flaxseed, Linseed, (FLAX SEED OIL PO) Take  by mouth.  Omega-3 Fatty Acids (FISH OIL PO) Take  by mouth.  Coenzyme Q10 (CO Q 10 PO) Take  by mouth.  predniSONE (DELTASONE) 10 MG tablet 2 tablets 2 times a day for 2 days, then 1 Tablet 2 times a day for 2 days, then 1 tablet daily till gone 16 tablet 0    hydrocortisone 2.5 % cream Apply topically 2 times daily. 20 g 0     No current facility-administered medications for this visit. No orders of the defined types were placed in this encounter. Continue current medications. Return if symptoms worsen or fail to improve. Discussed use, benefit, and side effects of prescribed medications. All patient questions answered. Pt voiced understanding. Patient agreed with treatment plan.

## 2022-12-05 NOTE — PROGRESS NOTES
Subjective:      Patient ID: Christina Wagner is a 74 y.o. female. HPI  1. We discussed the up coming tests still  2. Reviewed the labs without significant findings  3. There redness of the toes seems to be peeling some. 4. The feet still will feel cold at times  Review of Systems   Constitutional: Negative for fatigue. HENT: Negative for sinus pressure. Eyes: Negative for visual disturbance. Respiratory: Negative for shortness of breath. Cardiovascular: Negative for chest pain. Gastrointestinal: Negative for constipation. Genitourinary: Negative. Musculoskeletal: Negative for arthralgias. Skin: Negative for rash. Neurological: Negative for headaches. The patient's medications, allergies, past medical problems, surgical, social, and family histories were reviewed and updated as needed. Objective:   Physical Exam   Constitutional: She is oriented to person, place, and time. She appears well-developed and well-nourished. No distress. HENT:   Head: Normocephalic and atraumatic. Eyes: Conjunctivae are normal. No scleral icterus. Neck: No tracheal deviation present. Cardiovascular: Normal rate, regular rhythm, normal heart sounds and intact distal pulses. Pulmonary/Chest: Effort normal and breath sounds normal.   Musculoskeletal: She exhibits no edema. Neurological: She is alert and oriented to person, place, and time. Skin: Skin is warm and dry. Psychiatric: She has a normal mood and affect. Her behavior is normal.   Blood pressure 128/60, pulse 72, resp. rate 16, height 5' (1.524 m), weight 125 lb 8 oz (56.9 kg), not currently breastfeeding. Assessment:       Diagnosis Orders   1. Discoloration of skin of toe  Eloina Trimble MD, Dermatology, [email or web address]           Plan:      Await the non invasive vascular studies  Await the CT urogram for the microscopic hematuria  Her daughter was present and asked several questions.  I told her that I did not think her mother being yes

## 2023-04-06 LAB
A/G RATIO: 1.6 (ref 1.5–2.5)
ABSOLUTE BASO #: 0 /CMM (ref 0–200)
ABSOLUTE EOS #: 200 /CMM (ref 0–500)
ABSOLUTE LYMPH #: 2300 /CMM (ref 1000–4800)
ABSOLUTE MONO #: 500 /CMM (ref 0–800)
ABSOLUTE NEUT #: 3400 /CMM (ref 1800–7700)
ALBUMIN SERPL-MCNC: 4.1 G/DL (ref 3.5–5)
ALP BLD-CCNC: 60 IU/L (ref 39–118)
ALT SERPL-CCNC: 25 IU/L (ref 10–40)
ANION GAP SERPL CALCULATED.3IONS-SCNC: 6 MMOL/L (ref 4–12)
APPEARANCE: CLEAR
AST SERPL-CCNC: 18 IU/L (ref 15–41)
BACTERIA: ABNORMAL
BASOPHILS RELATIVE PERCENT: 0.5 % (ref 0–2)
BILIRUB SERPL-MCNC: 0.4 MG/DL (ref 0.2–1)
BILIRUBIN URINE: NEGATIVE
BUN BLDV-MCNC: 20 MG/DL (ref 7–20)
CALCIUM SERPL-MCNC: 9.2 MG/DL (ref 8.8–10.5)
CHLORIDE BLD-SCNC: 106 MEQ/L (ref 101–111)
CHOLESTEROL/HDL RELATIVE RISK: 2.7 (ref 4–4.4)
CHOLESTEROL: 211 MG/DL
CO2: 31 MEQ/L (ref 21–32)
COLOR: YELLOW
CREAT SERPL-MCNC: 0.75 MG/DL (ref 0.6–1.3)
CREATININE CLEARANCE: >60
DIRECT-LDL / HDL RISK: 1.6
EOSINOPHILS RELATIVE PERCENT: 2.8 % (ref 0–6)
GLUCOSE URINE: NEGATIVE
GLUCOSE: 88 MG/DL (ref 70–110)
HCT VFR BLD CALC: 44.4 % (ref 35–44)
HDLC SERPL-MCNC: 78 MG/DL
HEMOGLOBIN: 15 GM/DL (ref 12–15)
KETONES, URINE: NEGATIVE
LDL CHOLESTEROL DIRECT: 128 MG/DL
LEUKOCYTES, UA: NEGATIVE
LYMPHOCYTES RELATIVE PERCENT: 35.5 % (ref 15–45)
MCH RBC QN AUTO: 33.4 PG (ref 27.5–33)
MCHC RBC AUTO-ENTMCNC: 33.9 GM/DL (ref 33–36)
MCV RBC AUTO: 98.6 CU MIC (ref 80–97)
MONOCYTES RELATIVE PERCENT: 8.3 % (ref 2–10)
NEUTROPHILS RELATIVE PERCENT: 52.9 % (ref 40–70)
NITRITE, URINE: NEGATIVE
NUCLEATED RBCS: 0 /100 WBC
PDW BLD-RTO: 12.5 % (ref 12–16)
PH, URINE: 5.5 (ref 5–8)
PLATELET # BLD: 267 TH/CMM (ref 150–400)
POTASSIUM SERPL-SCNC: 4.6 MEQ/L (ref 3.6–5)
PROTEIN, URINE: NEGATIVE
RBC # BLD: 4.5 MIL/CMM (ref 4–5.1)
RBC URINE: ABNORMAL /HPF
SODIUM BLD-SCNC: 143 MEQ/L (ref 135–145)
SPECIFIC GRAVITY UA: 1.02 (ref 1–1.03)
SQUAMOUS EPITHELIAL: ABNORMAL /HPF
TOTAL PROTEIN: 6.6 G/DL (ref 6.2–8)
TRIGL SERPL-MCNC: 61 MG/DL
URINALYSIS REFLEX: NO
URINE HGB: ABNORMAL
UROBILINOGEN, URINE: NORMAL (ref 0.2–1)
VLDLC SERPL CALC-MCNC: 12 MG/DL
WBC # BLD: 6.3 TH/CMM (ref 4.4–10.5)
WBC URINE: ABNORMAL /HPF

## 2024-07-08 ENCOUNTER — APPOINTMENT (OUTPATIENT)
Dept: GENERAL RADIOLOGY | Age: 79
End: 2024-07-08
Payer: MEDICARE

## 2024-07-08 ENCOUNTER — HOSPITAL ENCOUNTER (EMERGENCY)
Age: 79
Discharge: HOME | End: 2024-07-08
Payer: MEDICARE

## 2024-07-08 VITALS
RESPIRATION RATE: 17 BRPM | HEART RATE: 81 BPM | SYSTOLIC BLOOD PRESSURE: 160 MMHG | OXYGEN SATURATION: 98 % | TEMPERATURE: 97.4 F | DIASTOLIC BLOOD PRESSURE: 73 MMHG

## 2024-07-08 DIAGNOSIS — S82.65XA CLOSED NONDISPLACED FRACTURE OF LATERAL MALLEOLUS OF LEFT FIBULA, INITIAL ENCOUNTER: Primary | ICD-10-CM

## 2024-07-08 PROCEDURE — 99203 OFFICE O/P NEW LOW 30 MIN: CPT

## 2024-07-08 PROCEDURE — 73610 X-RAY EXAM OF ANKLE: CPT

## 2024-07-08 ASSESSMENT — PAIN - FUNCTIONAL ASSESSMENT: PAIN_FUNCTIONAL_ASSESSMENT: 0-10

## 2024-07-08 ASSESSMENT — PAIN SCALES - GENERAL: PAINLEVEL_OUTOF10: 3

## 2024-07-08 NOTE — ED NOTES
Pt to [facility] with c/o left foot pain. Pt reports that they rolled their ankle while sliding their sandal on. Pt reports their foot was numb from having their legs crossed and then putting the shoe on.      Tati Emerson RN  07/08/24 [id number]

## 2024-07-08 NOTE — DISCHARGE INSTRUCTIONS
To OIO   Rest, apply ice, elevate.  Tylenol and Ibuprofen use.  Boot for additional support until seen by [facility].

## 2024-07-08 NOTE — ED PROVIDER NOTES
[facility]RGENT [facility]ARE  Urgent Care Encounter       CHIEF COMPLAINT       Chief Complaint   Patient presents with    Foot Pain     left       Nurses Notes reviewed and I agree except as noted in the HPI.  HISTORY OF PRESENT ILLNESS   Israel Lay is a 78 y.o. female who presents with concerns of left ankle pain. Patient reports twisted ankle laterally while attempting to walk and slide flip flop back onto foot. Reports has been applying IcyHot, ice, and wrapping for additional support.     HPI    REVIEW OF SYSTEMS     Review of Systems   Musculoskeletal:         Left ankle pain   All other systems reviewed and are negative.      PAST MEDICAL HISTORY         Diagnosis Date    Age-related osteoporosis without current pathological fracture 09/10/2021    Hyperlipidemia     Low back pain 06/26/2015    Osteopenia 07/2019    Renal cyst, acquired, right 07/2019    Vitamin D deficiency        SURGICALHISTORY     Patient  has a past surgical history that includes Tonsillectomy and Pilonidal cyst excision (1972).    CURRENT MEDICATIONS       Previous Medications    CHOLECALCIFEROL (VITAMIN D3) 5000 UNITS CAPS    Take 1 capsule by mouth daily.    COENZYME Q10 (CO Q 10 PO)    Take  by mouth.    FLAXSEED, LINSEED, (FLAX SEED OIL PO)    Take  by mouth.    HYDROCORTISONE 2.5 % CREAM    Apply topically 2 times daily.    OMEGA-3 FATTY ACIDS (FISH OIL PO)    Take  by mouth.    PRAVASTATIN (PRAVACHOL) 40 MG TABLET    Take 1 tablet by mouth daily    PREDNISONE (DELTASONE) 10 MG TABLET    2 tablets 2 times a day for 2 days, then 1 Tablet 2 times a day for 2 days, then 1 tablet daily till gone       ALLERGIES     Patient is is allergic to atorvastatin.    Patients   Immunization History   Administered Date(s) Administered    Influenza Virus Vaccine 09/30/2019    Influenza, AFLURIA (age 3 yrs+), FLUZONE, (age 6 mo+), MDV, 0.5mL 10/02/2017       FAMILY HISTORY     Patient's family history includes Emphysema in her mother;  Heart Disease in her father and mother.    SOCIAL HISTORY     Patient  reports that she has been smoking cigarettes. She has a 30.0 pack-year smoking history. She has never used smokeless tobacco. She reports that she does not drink alcohol and does not use drugs.    PHYSICAL EXAM     ED TRIAGE VITALS  BP: (!) 160/73, Temp: 97.4 °F (36.3 °C), Pulse: 81, Respirations: 17, SpO2: 98 %,Estimated body mass index is 22.5 kg/m² as calculated from the following:    Height as of 7/6/22: 1.524 m (5').    Weight as of 7/6/22: 52.3 kg (115 lb 3.2 oz).,No LMP recorded. Patient is postmenopausal.    Physical Exam  Vitals and nursing note reviewed.   Constitutional:       General: She is not in acute distress.     Appearance: Normal appearance. She is normal weight. She is not ill-appearing, toxic-appearing or diaphoretic.   Eyes:      Pupils: Pupils are equal, round, and reactive to light.   Cardiovascular:      Rate and Rhythm: Normal rate and regular rhythm.      Heart sounds: Normal heart sounds.   Pulmonary:      Effort: Pulmonary effort is normal.   Musculoskeletal:      Right ankle: Normal.      Left ankle: Swelling present. No deformity, ecchymosis or lacerations. Tenderness present over the lateral malleolus. Decreased range of motion. Anterior drawer test negative. Normal pulse.      Left Achilles Tendon: Normal.        Legs:    Skin:     General: Skin is warm and dry.      Capillary Refill: Capillary refill takes less than 2 seconds.      Findings: No bruising or erythema.   Neurological:      General: No focal deficit present.      Mental Status: She is alert.   Psychiatric:         Mood and Affect: Mood normal.         Behavior: Behavior is cooperative.         DIAGNOSTIC RESULTS     Labs:No results found for this visit on 07/08/24.    IMAGING:    XR ANKLE LEFT (MIN 3 VIEWS)   Final Result      1. Transverse nondisplaced fracture in the lateral malleolus with overlying soft   tissue swelling..               **This